# Patient Record
Sex: FEMALE | Race: WHITE | NOT HISPANIC OR LATINO | Employment: OTHER | URBAN - METROPOLITAN AREA
[De-identification: names, ages, dates, MRNs, and addresses within clinical notes are randomized per-mention and may not be internally consistent; named-entity substitution may affect disease eponyms.]

---

## 2017-05-05 DIAGNOSIS — Z95.2 S/P AVR: Primary | ICD-10-CM

## 2017-05-05 DIAGNOSIS — I10 ESSENTIAL HYPERTENSION: ICD-10-CM

## 2017-05-22 ENCOUNTER — HOSPITAL ENCOUNTER (OUTPATIENT)
Dept: CARDIOLOGY | Facility: CLINIC | Age: 82
Discharge: HOME | End: 2017-05-22
Payer: COMMERCIAL

## 2017-05-22 DIAGNOSIS — I10 ESSENTIAL HYPERTENSION: ICD-10-CM

## 2017-05-22 DIAGNOSIS — Z95.2 S/P AVR: ICD-10-CM

## 2017-05-22 LAB
ESTIMATED PA SYSTOLIC PRESSURE: 36.64
MITRAL VALVE REGURGITATION: NORMAL
RETIRED EF AND QEF - SEE NOTES: 60 (ref 55–65)
TRICUSPID VALVE REGURGITATION: NORMAL

## 2017-05-22 PROCEDURE — 93306 TTE W/DOPPLER COMPLETE: CPT | Mod: PBBFAC | Performed by: INTERNAL MEDICINE

## 2017-05-23 ENCOUNTER — OFFICE VISIT (OUTPATIENT)
Dept: CARDIOLOGY | Facility: CLINIC | Age: 82
End: 2017-05-23
Payer: COMMERCIAL

## 2017-05-23 VITALS
BODY MASS INDEX: 20.34 KG/M2 | HEIGHT: 66 IN | DIASTOLIC BLOOD PRESSURE: 63 MMHG | WEIGHT: 126.56 LBS | HEART RATE: 63 BPM | SYSTOLIC BLOOD PRESSURE: 137 MMHG

## 2017-05-23 DIAGNOSIS — I35.9 NONRHEUMATIC AORTIC VALVE DISORDER: Primary | ICD-10-CM

## 2017-05-23 DIAGNOSIS — Z87.891 FORMER CIGARETTE SMOKER: ICD-10-CM

## 2017-05-23 DIAGNOSIS — Z95.2 S/P AVR: ICD-10-CM

## 2017-05-23 DIAGNOSIS — I10 HTN (HYPERTENSION), BENIGN: ICD-10-CM

## 2017-05-23 PROCEDURE — 1159F MED LIST DOCD IN RCRD: CPT | Mod: S$GLB,,, | Performed by: INTERNAL MEDICINE

## 2017-05-23 PROCEDURE — 1160F RVW MEDS BY RX/DR IN RCRD: CPT | Mod: S$GLB,,, | Performed by: INTERNAL MEDICINE

## 2017-05-23 PROCEDURE — 3075F SYST BP GE 130 - 139MM HG: CPT | Mod: S$GLB,,, | Performed by: INTERNAL MEDICINE

## 2017-05-23 PROCEDURE — 99999 PR PBB SHADOW E&M-EST. PATIENT-LVL III: CPT | Mod: PBBFAC,,, | Performed by: INTERNAL MEDICINE

## 2017-05-23 PROCEDURE — 99215 OFFICE O/P EST HI 40 MIN: CPT | Mod: S$GLB,,, | Performed by: INTERNAL MEDICINE

## 2017-05-23 PROCEDURE — 1126F AMNT PAIN NOTED NONE PRSNT: CPT | Mod: S$GLB,,, | Performed by: INTERNAL MEDICINE

## 2017-05-23 PROCEDURE — 3078F DIAST BP <80 MM HG: CPT | Mod: S$GLB,,, | Performed by: INTERNAL MEDICINE

## 2017-05-23 PROCEDURE — 1157F ADVNC CARE PLAN IN RCRD: CPT | Mod: 8P,S$GLB,, | Performed by: INTERNAL MEDICINE

## 2017-05-23 RX ORDER — OMEPRAZOLE 20 MG/1
20 CAPSULE, DELAYED RELEASE ORAL DAILY
COMMUNITY
End: 2019-12-18

## 2017-05-23 RX ORDER — CANDESARTAN 4 MG/1
TABLET ORAL
Refills: 3 | COMMUNITY
Start: 2017-03-20 | End: 2021-07-27

## 2017-05-23 RX ORDER — ASPIRIN 81 MG/1
81 TABLET ORAL DAILY
COMMUNITY

## 2017-05-24 ENCOUNTER — TELEPHONE (OUTPATIENT)
Dept: CARDIOLOGY | Facility: CLINIC | Age: 82
End: 2017-05-24

## 2017-05-24 NOTE — TELEPHONE ENCOUNTER
----- Message from Consuelo Neely MA sent at 5/24/2017  9:02 AM CDT -----  Contact: Dr.Bower Espinal please call the  at [phone] he would like a copy of her echo. Thank you.

## 2017-05-24 NOTE — PROGRESS NOTES
"Subjective:   Patient ID:  Rita Lopez is a 83 y.o. female who presents for evaluation of Aortic Stenosis      HPI: Ms. Lopez is a very pleasant woman previously seen by Praveen Lloyd and Veronica who presents today for chronic f/u of valvular heart disease.  At the beginning of this year, she was having "spells" of sweating and possible weakness while at her home in [place], and at the request of her  (a retired cardiologist), she was evaluated and found to have severe aortic stenosis.  A mini-AVR was done by right thoracotomy using a 21mm pericardial bioprosthesis (by his recollection) and she has done well.  She recovered quickly and has remained active since.  She did not do cardiac rehab.    By verbal report from her , angiography was performed prior to the operation and no significant obstructive disease was noted.    She had an echocardiogram done yesterday.  My conclusions:      1 - Concentric remodeling.     2 - Normal left ventricular systolic function (EF 60-65%).     3 - Normal right ventricular systolic function .     4 - Indeterminate LV diastolic function.     5 - Biatrial enlargement.     6 - Mild tricuspid regurgitation.     7 - Normally functioning bioprosthesis in the aortic position.     8 - The estimated PA systolic pressure is 37 mmHg.     The mean gradient across the bioprosthesis was only 8mm Hg.    Past Medical History:   Diagnosis Date    Hypertension     Stenosis of aortic and mitral valves     aortic SHERRY 1.01    Subdural hematoma        Past Surgical History:   Procedure Laterality Date    CARDIAC CATHETERIZATION  2017    CARDIAC VALVE SURGERY  2017    aortic valve replacement     SECTION, CLASSIC      subural hematoma repair      TONSILLECTOMY         Social History   Substance Use Topics    Smoking status: Former Smoker     Quit date: 1995    Smokeless tobacco: Not on file    Alcohol use 2.4 oz/week     4 Glasses of wine per week     "  Comment: moderate       Family History   Problem Relation Age of Onset    Alzheimer's disease Father     Heart disease Father     Hypertension Father     Hypertension Sister     Heart attack Brother     Heart disease Brother        Current Outpatient Prescriptions   Medication Sig    aspirin (ECOTRIN) 81 MG EC tablet Take 81 mg by mouth once daily.    candesartan (ATACAND) 4 MG tablet TK 1 T PO BID    diphenhydrAMINE (BENADRYL) 12.5 mg chewable tablet Take 12.5 mg by mouth 4 (four) times daily as needed.      omeprazole (PRILOSEC) 20 MG capsule Take 20 mg by mouth 2 (two) times daily.     No current facility-administered medications for this visit.        Review of patient's allergies indicates:   Allergen Reactions    Iodinated contrast media - oral and iv dye     Pcn [penicillins] Rash       Review of Systems   Constitution: Negative.   HENT: Negative.    Eyes: Negative.    Cardiovascular: Negative.  Negative for chest pain, dyspnea on exertion, near-syncope, orthopnea and palpitations.   Respiratory: Negative.  Negative for cough, hemoptysis and shortness of breath.    Endocrine: Negative.    Hematologic/Lymphatic: Negative.    Skin: Negative.    Musculoskeletal: Negative.    Gastrointestinal: Negative.    Genitourinary: Negative.    Neurological: Negative.    Psychiatric/Behavioral: Negative.      Objective:   Physical Exam   Constitutional: She is oriented to person, place, and time. She appears well-developed and well-nourished.   HENT:   Head: Normocephalic and atraumatic.   Mouth/Throat: Oropharynx is clear and moist.   Eyes: Conjunctivae and EOM are normal. No scleral icterus.   Neck: Normal range of motion. Neck supple. No JVD present.   Cardiovascular: Normal rate, regular rhythm and intact distal pulses.  Exam reveals no gallop and no friction rub.    Murmur (a soft early systolic murmur is heard at the base) heard.  Pulmonary/Chest: Effort normal and breath sounds normal. She has no  wheezes. She has no rales.   Abdominal: Soft. Bowel sounds are normal. She exhibits no distension. There is no tenderness.   Musculoskeletal: Normal range of motion. She exhibits no edema.   Neurological: She is alert and oriented to person, place, and time.   Skin: Skin is warm and dry. No rash noted. No erythema.   Psychiatric: She has a normal mood and affect. Her behavior is normal. Judgment and thought content normal.   Vitals reviewed.      Lab Results   Component Value Date    WBC 7.21 01/23/2014    HGB 12.9 01/23/2014    HCT 40.2 01/23/2014    MCV 94 01/23/2014     01/23/2014         Chemistry        Component Value Date/Time     (L) 01/23/2013 1838    K 4.5 01/23/2013 1838     01/23/2013 1838    CO2 23 01/23/2013 1838    BUN 12 01/23/2013 1838    CREATININE 0.7 01/23/2013 1838     01/23/2013 1838        Component Value Date/Time    CALCIUM 8.7 01/23/2013 1838    ALKPHOS 38 (L) 01/23/2013 1838    AST 30 01/23/2013 1838    ALT 30 01/23/2013 1838    BILITOT 0.3 01/23/2013 1838            No results found for: CHOL  No results found for: HDL  No results found for: LDLCALC  No results found for: TRIG  No results found for: CHOLHDL    No results found for: TSH    No results found for: HGBA1C      Assessment:     1. Nonrheumatic aortic valve disorder    2. S/P AVR - 21mm bioprosthesis (Jan 2017)    3. HTN (hypertension), benign    4. Former cigarette smoker        Plan:     Obtain outside records    Continue current medicines.    Diet/exercise goals reinforced.    Cardiac rehabilitation is technically indicated but we had a discussion about the benefits of this and given how active she is and her good baseline exercise regimen, I think the yield would be below average.    F/U 12 months    I spent > 40 minutes face to face with the patient and her  discussing her care, providing information and counseling, and answering questions.

## 2017-05-26 ENCOUNTER — PATIENT MESSAGE (OUTPATIENT)
Dept: CARDIOLOGY | Facility: CLINIC | Age: 82
End: 2017-05-26

## 2017-05-30 ENCOUNTER — PATIENT MESSAGE (OUTPATIENT)
Dept: CARDIOLOGY | Facility: CLINIC | Age: 82
End: 2017-05-30

## 2018-06-04 DIAGNOSIS — I35.9 NONRHEUMATIC AORTIC VALVE DISORDER: Primary | ICD-10-CM

## 2018-06-04 DIAGNOSIS — Z95.2 S/P AVR: ICD-10-CM

## 2018-06-28 ENCOUNTER — HOSPITAL ENCOUNTER (EMERGENCY)
Facility: HOSPITAL | Age: 83
Discharge: HOME | End: 2018-06-29
Attending: EMERGENCY MEDICINE
Payer: COMMERCIAL

## 2018-06-28 DIAGNOSIS — M25.569 KNEE PAIN, ACUTE: ICD-10-CM

## 2018-06-28 DIAGNOSIS — S80.01XA CONTUSION OF RIGHT PATELLA, INITIAL ENCOUNTER: Primary | ICD-10-CM

## 2018-06-28 PROCEDURE — 99283 EMERGENCY DEPT VISIT LOW MDM: CPT | Mod: ,,, | Performed by: PHYSICIAN ASSISTANT

## 2018-06-28 PROCEDURE — 25000003 PHARM REV CODE 250: Performed by: PHYSICIAN ASSISTANT

## 2018-06-28 PROCEDURE — 99283 EMERGENCY DEPT VISIT LOW MDM: CPT

## 2018-06-28 RX ORDER — ACETAMINOPHEN 500 MG
1000 TABLET ORAL
Status: COMPLETED | OUTPATIENT
Start: 2018-06-28 | End: 2018-06-28

## 2018-06-28 RX ADMIN — ACETAMINOPHEN 500 MG: 500 TABLET, COATED ORAL at 10:06

## 2018-06-29 VITALS
OXYGEN SATURATION: 99 % | SYSTOLIC BLOOD PRESSURE: 173 MMHG | TEMPERATURE: 98 F | WEIGHT: 120 LBS | RESPIRATION RATE: 18 BRPM | HEART RATE: 50 BPM | HEIGHT: 66 IN | DIASTOLIC BLOOD PRESSURE: 74 MMHG | BODY MASS INDEX: 19.29 KG/M2

## 2018-06-29 NOTE — DISCHARGE INSTRUCTIONS
Follow up with orthopedics as scheduled. Rest. Apply ice to the area and elevate the leg. Take Tylenol as needed for pain.

## 2018-06-29 NOTE — ED TRIAGE NOTES
Pt reports tripping over a box at work, states she slid forward on the floor and hit her R knee, bruising noted to knee. No tenderness to touch, but tender when bending and straightening knee. States she did not hit her head or lose consciousness. No other complaints

## 2018-06-29 NOTE — ED NOTES
Patient Identifiers for Rita Lopez checked and correct  LOC: The patient is awake, alert and aware of environment with an appropriate affect, the patient is oriented x 3 and speaking appropriate.  APPEARANCE: Patient resting comfortably and in no acute distress, patient is clean and well groomed, patient's clothing is properly fastened.  SKIN: The skin is warm and dry, patient has normal skin turgor and moist mucus membranes,no rashes or lesions.Skin Intact , No Breakdown Noted  Musculoskeletal :  Normal range of motion noted. Moves all extremeties well, No swelling noted. Tenderness to R knee when bending and straightening knee. Bruising noted to R knee  RESPIRATORY: Airway is open and patent, respirations are spontaneous, patient has a normal effort and rate.  CARDIAC: Patient has a normal rate and rhythm, no periphreal edema noted, capillary refill < 3 seconds.   ABDOMEN: Soft and non tender to palpation, no distention noted.   PULSES: 2+  And symmetrical in all extremeties  NEUROLOGIC: PERRL, facial expression is symmetrical, patient moving all extremities, normal sensation in all extremities when touched with a finger.The patient is awake, alert and cooperative with a calm affect, patient is aware of environment.    Will continue to monitor

## 2018-06-29 NOTE — ED PROVIDER NOTES
Encounter Date: 2018       History     Chief Complaint   Patient presents with    Knee Pain     pt states she was walking and something was in the way; she tripped over it and fell to her right knee; pt denies hitting head; denies LOC; no bleeding noted; bruising to right knee      84-year-old female presents to the ED with chief complaint of knee pain.  Patient reports a mechanical fall a couple of hours prior to arrival.  Patient reports tripping on a box on her floor causing her to fall onto her right knee on her stone floor.  She put her hands out to catch herself.  She denies pain to the upper extremities, neck pain, head trauma, numbness or tingling.  Patient is able to stand and walk with assistance.  Her right knee pain is exacerbated with extending the leg.  Denies anticoagulation use.           Review of patient's allergies indicates:   Allergen Reactions    Iodinated contrast- oral and iv dye     Pcn [penicillins] Rash     Past Medical History:   Diagnosis Date    Hypertension     Stenosis of aortic and mitral valves     aortic SHERRY 1.01    Subdural hematoma      Past Surgical History:   Procedure Laterality Date    CARDIAC CATHETERIZATION  2017    CARDIAC VALVE SURGERY  2017    aortic valve replacement     SECTION, CLASSIC      subural hematoma repair      TONSILLECTOMY       Family History   Problem Relation Age of Onset    Alzheimer's disease Father     Heart disease Father     Hypertension Father     Hypertension Sister     Heart attack Brother     Heart disease Brother      Social History   Substance Use Topics    Smoking status: Former Smoker     Quit date: 1995    Smokeless tobacco: Not on file    Alcohol use 2.4 oz/week     4 Glasses of wine per week      Comment: moderate     Review of Systems   Constitutional: Negative for fever.   HENT: Negative for sore throat.    Respiratory: Negative for shortness of breath.    Cardiovascular: Negative for chest  pain.   Gastrointestinal: Negative for nausea.   Genitourinary: Negative for dysuria.   Musculoskeletal: Positive for arthralgias. Negative for back pain and neck pain.        R knee pain   Skin: Negative for rash.   Neurological: Negative for weakness, numbness and headaches.   Hematological: Does not bruise/bleed easily.       Physical Exam     Initial Vitals [06/28/18 2148]   BP Pulse Resp Temp SpO2   (!) 167/72 63 18 98.5 °F (36.9 °C) 97 %      MAP       --         Physical Exam    Nursing note and vitals reviewed.  Constitutional: She appears well-developed and well-nourished. She is not diaphoretic.  Non-toxic appearance. She does not appear ill. No distress.   HENT:   Head: Normocephalic and atraumatic.   Neck: Neck supple.   Cardiovascular: Normal rate and regular rhythm. Exam reveals no gallop and no friction rub.    No murmur heard.  Pulmonary/Chest: Effort normal and breath sounds normal. No accessory muscle usage. No tachypnea. No respiratory distress. She has no decreased breath sounds. She has no wheezes. She has no rhonchi. She has no rales.   Abdominal: She exhibits no distension.   Musculoskeletal: Normal range of motion.   Contusion over the right patella with tenderness and moderate swelling. Full range of motion of the knee with slight pain with extension of the lower leg.  Normal sensation to light touch.  No gross deformity.  No joint laxity.  DP pulse intact.    Neurological: She is alert.   Skin: Skin is warm and dry. No rash noted. No pallor.   Psychiatric: She has a normal mood and affect. Her behavior is normal.         ED Course   Procedures  Labs Reviewed - No data to display       Imaging Results          X-Ray Knee 3 View Right (Final result)  Result time 06/28/18 23:55:12    Final result by Oh Infante MD (06/28/18 23:55:12)                 Impression:      No acute fracture.  No acute findings.    Degenerative changes as above.  Chondrocalcinosis suggests possibility of CPPD  arthropathy.  Calcification in the suprapatellar recess suggest possibility of osteochondromatosis.      Electronically signed by: Oh Infante MD  Date:    06/28/2018  Time:    23:55             Narrative:    EXAMINATION:  XR KNEE 3 VIEW RIGHT    CLINICAL HISTORY:  Pain in unspecified knee    TECHNIQUE:  AP, lateral, and Merchant views of the right knee were performed.    COMPARISON:  None    FINDINGS:  No fracture or dislocation.  No joint effusion.  Degenerative narrowing of the patellofemoral joint space with spurring.  Mild spurring at the tibial spines.  Calcification in the suprapatellar recess.  Chondrocalcinosis in the tibial femoral joints.    Atherosclerotic vascular calcifications present.                                 Medical Decision Making:   History:   Old Medical Records: I decided to obtain old medical records.  Differential Diagnosis:   My differential diagnosis includes but is not limited to:  Contusion, sprain, fracture, dislocation, ligamentous injury   Clinical Tests:   Radiological Study: Ordered and Reviewed       APC / Resident Notes:   85 yo F presents for evaluation of R knee pain after a mechanical fall today.  She is neurovascularly intact.  There is a patellar contusion with small effusion.    X-ray reveals no acute fracture or other acute acute process.  I will discharge patient her with instructions for RICE therapy and Tylenol as needed for pain. Patient reports that she has an appointment with her orthopedist tomorrow. I have reviewed the patient's records and discussed this case with my supervising physician.                   Clinical Impression:   The primary encounter diagnosis was Contusion of right patella, initial encounter. A diagnosis of Knee pain, acute was also pertinent to this visit.      Disposition:   Disposition: Discharged  Condition: Stable                        Lissette Moreno PA-C  06/29/18 0017

## 2018-07-24 ENCOUNTER — HOSPITAL ENCOUNTER (OUTPATIENT)
Dept: CARDIOLOGY | Facility: CLINIC | Age: 83
Discharge: HOME | End: 2018-07-24
Attending: INTERNAL MEDICINE
Payer: COMMERCIAL

## 2018-07-24 DIAGNOSIS — Z95.2 S/P AVR: ICD-10-CM

## 2018-07-24 DIAGNOSIS — I35.9 NONRHEUMATIC AORTIC VALVE DISORDER: ICD-10-CM

## 2018-07-24 PROCEDURE — 93306 TTE W/DOPPLER COMPLETE: CPT | Mod: S$GLB,,, | Performed by: INTERNAL MEDICINE

## 2018-07-26 LAB
DIASTOLIC DYSFUNCTION: NO
ESTIMATED PA SYSTOLIC PRESSURE: 34.14
MITRAL VALVE REGURGITATION: NORMAL
RETIRED EF AND QEF - SEE NOTES: 60 (ref 55–65)
TRICUSPID VALVE REGURGITATION: NORMAL

## 2018-07-27 ENCOUNTER — OFFICE VISIT (OUTPATIENT)
Dept: CARDIOLOGY | Facility: CLINIC | Age: 83
End: 2018-07-27
Payer: COMMERCIAL

## 2018-07-27 VITALS
HEIGHT: 66 IN | WEIGHT: 127 LBS | BODY MASS INDEX: 20.41 KG/M2 | SYSTOLIC BLOOD PRESSURE: 124 MMHG | DIASTOLIC BLOOD PRESSURE: 67 MMHG | HEART RATE: 65 BPM

## 2018-07-27 DIAGNOSIS — I10 HTN (HYPERTENSION), BENIGN: ICD-10-CM

## 2018-07-27 DIAGNOSIS — I35.9 NONRHEUMATIC AORTIC VALVE DISORDER: Primary | ICD-10-CM

## 2018-07-27 DIAGNOSIS — E78.5 DYSLIPIDEMIA: ICD-10-CM

## 2018-07-27 DIAGNOSIS — Z87.891 FORMER CIGARETTE SMOKER: ICD-10-CM

## 2018-07-27 DIAGNOSIS — Z95.2 S/P AVR: ICD-10-CM

## 2018-07-27 PROCEDURE — 99999 PR PBB SHADOW E&M-EST. PATIENT-LVL III: CPT | Mod: PBBFAC,,, | Performed by: INTERNAL MEDICINE

## 2018-07-27 PROCEDURE — 3078F DIAST BP <80 MM HG: CPT | Mod: CPTII,S$GLB,, | Performed by: INTERNAL MEDICINE

## 2018-07-27 PROCEDURE — 3074F SYST BP LT 130 MM HG: CPT | Mod: CPTII,S$GLB,, | Performed by: INTERNAL MEDICINE

## 2018-07-27 PROCEDURE — 99214 OFFICE O/P EST MOD 30 MIN: CPT | Mod: S$GLB,,, | Performed by: INTERNAL MEDICINE

## 2018-07-27 RX ORDER — PNV NO.95/FERROUS FUM/FOLIC AC 28MG-0.8MG
100 TABLET ORAL
COMMUNITY
End: 2019-12-18

## 2018-07-27 RX ORDER — VIT C/E/ZN/COPPR/LUTEIN/ZEAXAN 250MG-90MG
1000 CAPSULE ORAL DAILY
COMMUNITY
Start: 2017-07-03 | End: 2021-07-27

## 2018-07-27 NOTE — PROGRESS NOTES
"Subjective:   Patient ID:  Rita Lopez is a 84 y.o. female who presents for follow up of No chief complaint on file.      HPI: Yearly f/u of aortic valve disease s/p 21mm bioprosthesis AVR plus HTN and dyslipidemia.      She denies chest discomfort, GALVAN, palpitations, PND/orthopnea, lightheadedness and syncope.    Recent echocardiogram looked good with no important new findings.    5/23/17 HPI: Ms. Lopez is a very pleasant woman previously seen by Praveen Lloyd and Veronica who presents today for chronic f/u of valvular heart disease.  At the beginning of this year, she was having "spells" of sweating and possible weakness while at her home in [place], and at the request of her  (a retired cardiologist), she was evaluated and found to have severe aortic stenosis.  A mini-AVR was done by right thoracotomy using a 21mm pericardial bioprosthesis (by his recollection) and she has done well.  She recovered quickly and has remained active since.  She did not do cardiac rehab.     By verbal report from her , angiography was performed prior to the operation and no significant obstructive disease was noted.     She had an echocardiogram done yesterday.  My conclusions:      1 - Concentric remodeling.     2 - Normal left ventricular systolic function (EF 60-65%).     3 - Normal right ventricular systolic function .     4 - Indeterminate LV diastolic function.     5 - Biatrial enlargement.     6 - Mild tricuspid regurgitation.     7 - Normally functioning bioprosthesis in the aortic position.     8 - The estimated PA systolic pressure is 37 mmHg.      The mean gradient across the bioprosthesis was only 8mm Hg.    Patient Active Problem List   Diagnosis    Chest pain    Nonrheumatic aortic valve disorder    HTN (hypertension), benign    Sleep disorder    Dementia    Dyslipidemia    Former cigarette smoker    S/P AVR - 21mm bioprosthesis (Jan 2017)       Current Outpatient Prescriptions   Medication " Sig    aspirin (ECOTRIN) 81 MG EC tablet Take 81 mg by mouth once daily.    candesartan (ATACAND) 4 MG tablet TK 1 T PO BID    diphenhydrAMINE (BENADRYL) 12.5 mg chewable tablet Take 12.5 mg by mouth 4 (four) times daily as needed.      omeprazole (PRILOSEC) 20 MG capsule Take 20 mg by mouth 2 (two) times daily.     No current facility-administered medications for this visit.        Review of Systems   Constitution: Negative.   HENT: Negative.    Eyes: Negative.    Cardiovascular: Negative.  Negative for chest pain, dyspnea on exertion, near-syncope, orthopnea and palpitations.   Respiratory: Negative.  Negative for cough, hemoptysis and shortness of breath.    Endocrine: Negative.    Hematologic/Lymphatic: Negative.    Skin: Negative.    Musculoskeletal: Negative.    Gastrointestinal: Negative.    Genitourinary: Negative.    Neurological: Negative.    Psychiatric/Behavioral: Negative.      Objective:   Physical Exam   Constitutional: She is oriented to person, place, and time. She appears well-developed and well-nourished.   HENT:   Head: Normocephalic and atraumatic.   Mouth/Throat: Oropharynx is clear and moist.   Eyes: Conjunctivae and EOM are normal. No scleral icterus.   Neck: Normal range of motion. Neck supple. No JVD present.   Cardiovascular: Normal rate, regular rhythm, normal heart sounds and intact distal pulses.  Exam reveals no gallop and no friction rub.    No murmur heard.  Pulmonary/Chest: Effort normal and breath sounds normal. She has no wheezes. She has no rales.   Abdominal: Soft. Bowel sounds are normal. She exhibits no distension. There is no tenderness.   Musculoskeletal: Normal range of motion. She exhibits no edema.   Neurological: She is alert and oriented to person, place, and time.   Skin: Skin is warm and dry. No rash noted. No erythema.   Psychiatric: She has a normal mood and affect. Her behavior is normal. Judgment and thought content normal.   Vitals reviewed.      Lab Results    Component Value Date    WBC 7.21 01/23/2014    HGB 12.9 01/23/2014    HCT 40.2 01/23/2014    MCV 94 01/23/2014     01/23/2014         Chemistry        Component Value Date/Time     (L) 01/23/2013 1838    K 4.5 01/23/2013 1838     01/23/2013 1838    CO2 23 01/23/2013 1838    BUN 12 01/23/2013 1838    CREATININE 0.7 01/23/2013 1838     01/23/2013 1838        Component Value Date/Time    CALCIUM 8.7 01/23/2013 1838    ALKPHOS 38 (L) 01/23/2013 1838    AST 30 01/23/2013 1838    ALT 30 01/23/2013 1838    BILITOT 0.3 01/23/2013 1838    ESTGFRAFRICA >60 01/23/2013 1838    EGFRNONAA >60 01/23/2013 1838            No results found for: CHOL  No results found for: HDL  No results found for: LDLCALC  No results found for: TRIG  No results found for: CHOLHDL    No results found for: TSH    No results found for: HGBA1C    Assessment:     1. Nonrheumatic aortic valve disorder    2. S/P AVR - 21mm bioprosthesis (Jan 2017)    3. HTN (hypertension), benign    4. Dyslipidemia    5. Former cigarette smoker        Plan:     Continue current medicines.    Diet/exercise goals reinforced.    F/U 12 months

## 2019-07-23 DIAGNOSIS — Z95.2 S/P AVR: ICD-10-CM

## 2019-07-23 DIAGNOSIS — I35.9 NONRHEUMATIC AORTIC VALVE DISORDER: Primary | ICD-10-CM

## 2019-11-11 DIAGNOSIS — R00.1 BRADYCARDIA: Primary | ICD-10-CM

## 2019-12-10 ENCOUNTER — CLINICAL SUPPORT (OUTPATIENT)
Dept: CARDIOLOGY | Facility: HOSPITAL | Age: 84
End: 2019-12-10
Attending: INTERNAL MEDICINE
Payer: COMMERCIAL

## 2019-12-10 ENCOUNTER — HOSPITAL ENCOUNTER (OUTPATIENT)
Dept: CARDIOLOGY | Facility: CLINIC | Age: 84
Discharge: HOME | End: 2019-12-10
Attending: INTERNAL MEDICINE
Payer: COMMERCIAL

## 2019-12-10 VITALS
SYSTOLIC BLOOD PRESSURE: 124 MMHG | BODY MASS INDEX: 19.29 KG/M2 | DIASTOLIC BLOOD PRESSURE: 78 MMHG | HEART RATE: 56 BPM | WEIGHT: 120 LBS | HEIGHT: 66 IN

## 2019-12-10 DIAGNOSIS — R00.1 BRADYCARDIA: ICD-10-CM

## 2019-12-10 DIAGNOSIS — Z95.2 S/P AVR: ICD-10-CM

## 2019-12-10 DIAGNOSIS — I35.9 NONRHEUMATIC AORTIC VALVE DISORDER: ICD-10-CM

## 2019-12-10 LAB
ASCENDING AORTA: 3.84 CM
AV INDEX (PROSTH): 0.53
AV MEAN GRADIENT: 13 MMHG
AV PEAK GRADIENT: 25 MMHG
AV VALVE AREA: 1.59 CM2
AV VELOCITY RATIO: 0.49
BSA FOR ECHO PROCEDURE: 1.59 M2
CV ECHO LV RWT: 0.44 CM
DOP CALC AO PEAK VEL: 2.51 M/S
DOP CALC AO VTI: 52.51 CM
DOP CALC LVOT AREA: 3 CM2
DOP CALC LVOT DIAMETER: 1.96 CM
DOP CALC LVOT PEAK VEL: 1.22 M/S
DOP CALC LVOT STROKE VOLUME: 83.23 CM3
DOP CALCLVOT PEAK VEL VTI: 27.6 CM
E WAVE DECELERATION TIME: 208.85 MSEC
E/A RATIO: 0.91
E/E' RATIO: 9.29 M/S
ECHO LV POSTERIOR WALL: 0.9 CM (ref 0.6–1.1)
FRACTIONAL SHORTENING: 41 % (ref 28–44)
INTERVENTRICULAR SEPTUM: 1.02 CM (ref 0.6–1.1)
LA MAJOR: 4.9 CM
LA MINOR: 4.97 CM
LA WIDTH: 3.71 CM
LEFT ATRIUM SIZE: 3.64 CM
LEFT ATRIUM VOLUME INDEX: 35.2 ML/M2
LEFT ATRIUM VOLUME: 56.64 CM3
LEFT INTERNAL DIMENSION IN SYSTOLE: 2.41 CM (ref 2.1–4)
LEFT VENTRICLE DIASTOLIC VOLUME INDEX: 36.16 ML/M2
LEFT VENTRICLE DIASTOLIC VOLUME: 58.21 ML
LEFT VENTRICLE MASS INDEX: 78 G/M2
LEFT VENTRICLE SYSTOLIC VOLUME INDEX: 12.7 ML/M2
LEFT VENTRICLE SYSTOLIC VOLUME: 20.46 ML
LEFT VENTRICULAR INTERNAL DIMENSION IN DIASTOLE: 4.1 CM (ref 3.5–6)
LEFT VENTRICULAR MASS: 124.77 G
LV LATERAL E/E' RATIO: 9.88 M/S
LV SEPTAL E/E' RATIO: 8.78 M/S
MV PEAK A VEL: 0.87 M/S
MV PEAK E VEL: 0.79 M/S
PISA TR MAX VEL: 2.63 M/S
PULM VEIN S/D RATIO: 1.15
PV PEAK D VEL: 0.4 M/S
PV PEAK S VEL: 0.46 M/S
RA MAJOR: 5.5 CM
RA PRESSURE: 3 MMHG
RA WIDTH: 3.9 CM
RIGHT VENTRICULAR END-DIASTOLIC DIMENSION: 3.43 CM
RV TISSUE DOPPLER FREE WALL SYSTOLIC VELOCITY 1 (APICAL 4 CHAMBER VIEW): 12 CM/S
SINUS: 3.1 CM
STJ: 3.01 CM
TDI LATERAL: 0.08 M/S
TDI SEPTAL: 0.09 M/S
TDI: 0.09 M/S
TR MAX PG: 28 MMHG
TRICUSPID ANNULAR PLANE SYSTOLIC EXCURSION: 1.94 CM
TV REST PULMONARY ARTERY PRESSURE: 31 MMHG

## 2019-12-10 PROCEDURE — 93306 TRANSTHORACIC ECHO (TTE) COMPLETE (CUPID ONLY): ICD-10-PCS | Mod: S$GLB,,, | Performed by: INTERNAL MEDICINE

## 2019-12-10 PROCEDURE — 93227 XTRNL ECG REC<48 HR R&I: CPT | Mod: ,,, | Performed by: INTERNAL MEDICINE

## 2019-12-10 PROCEDURE — 93225 XTRNL ECG REC<48 HRS REC: CPT

## 2019-12-10 PROCEDURE — 93227 HOLTER MONITOR - 48 HOUR (CUPID ONLY): ICD-10-PCS | Mod: ,,, | Performed by: INTERNAL MEDICINE

## 2019-12-10 PROCEDURE — 93306 TTE W/DOPPLER COMPLETE: CPT | Mod: PBBFAC | Performed by: INTERNAL MEDICINE

## 2019-12-16 LAB
OHS CV EVENT MONITOR DAY: 0
OHS CV HOLTER LENGTH DECIMAL HOURS: 48
OHS CV HOLTER LENGTH HOURS: 48
OHS CV HOLTER LENGTH MINUTES: 0

## 2019-12-18 ENCOUNTER — OFFICE VISIT (OUTPATIENT)
Dept: CARDIOLOGY | Facility: CLINIC | Age: 84
End: 2019-12-18
Payer: COMMERCIAL

## 2019-12-18 VITALS
HEIGHT: 64 IN | WEIGHT: 140.88 LBS | SYSTOLIC BLOOD PRESSURE: 145 MMHG | DIASTOLIC BLOOD PRESSURE: 70 MMHG | HEART RATE: 52 BPM | BODY MASS INDEX: 24.05 KG/M2

## 2019-12-18 DIAGNOSIS — Z87.891 FORMER CIGARETTE SMOKER: ICD-10-CM

## 2019-12-18 DIAGNOSIS — Z95.2 S/P AVR: ICD-10-CM

## 2019-12-18 DIAGNOSIS — I35.9 NONRHEUMATIC AORTIC VALVE DISORDER: Primary | ICD-10-CM

## 2019-12-18 DIAGNOSIS — E78.5 DYSLIPIDEMIA: ICD-10-CM

## 2019-12-18 DIAGNOSIS — I10 HTN (HYPERTENSION), BENIGN: ICD-10-CM

## 2019-12-18 PROCEDURE — 99213 OFFICE O/P EST LOW 20 MIN: CPT | Mod: PBBFAC | Performed by: INTERNAL MEDICINE

## 2019-12-18 PROCEDURE — 99999 PR PBB SHADOW E&M-EST. PATIENT-LVL III: CPT | Mod: PBBFAC,,, | Performed by: INTERNAL MEDICINE

## 2019-12-18 PROCEDURE — 99214 PR OFFICE/OUTPT VISIT, EST, LEVL IV, 30-39 MIN: ICD-10-PCS | Mod: S$GLB,,, | Performed by: INTERNAL MEDICINE

## 2019-12-18 PROCEDURE — 99999 PR PBB SHADOW E&M-EST. PATIENT-LVL III: ICD-10-PCS | Mod: PBBFAC,,, | Performed by: INTERNAL MEDICINE

## 2019-12-18 PROCEDURE — 99214 OFFICE O/P EST MOD 30 MIN: CPT | Mod: S$GLB,,, | Performed by: INTERNAL MEDICINE

## 2019-12-18 PROCEDURE — 1159F MED LIST DOCD IN RCRD: CPT | Mod: S$GLB,,, | Performed by: INTERNAL MEDICINE

## 2019-12-18 PROCEDURE — 1159F PR MEDICATION LIST DOCUMENTED IN MEDICAL RECORD: ICD-10-PCS | Mod: S$GLB,,, | Performed by: INTERNAL MEDICINE

## 2019-12-18 PROCEDURE — 1126F PR PAIN SEVERITY QUANTIFIED, NO PAIN PRESENT: ICD-10-PCS | Mod: S$GLB,,, | Performed by: INTERNAL MEDICINE

## 2019-12-18 PROCEDURE — 1126F AMNT PAIN NOTED NONE PRSNT: CPT | Mod: S$GLB,,, | Performed by: INTERNAL MEDICINE

## 2019-12-18 RX ORDER — ESCITALOPRAM OXALATE 10 MG/1
10 TABLET ORAL DAILY
Refills: 5 | COMMUNITY
Start: 2019-12-03

## 2019-12-18 RX ORDER — ROSUVASTATIN CALCIUM 10 MG/1
TABLET, COATED ORAL
Refills: 3 | COMMUNITY
Start: 2019-12-04 | End: 2021-07-27

## 2019-12-18 RX ORDER — FAMOTIDINE 40 MG/1
TABLET, FILM COATED ORAL
Refills: 3 | COMMUNITY
Start: 2019-11-22 | End: 2021-07-27

## 2019-12-18 NOTE — PROGRESS NOTES
"Subjective:   Patient ID:  Rita Lopez is a 86 y.o. female who presents for follow up of Nonrheumatic aortic valve disorder (1 yr fu)      HPI: Yearly f/u as below.  She is doing well with no new symptoms or cardiovascular complaints and no change in exercise capacity.  She denies chest discomfort, GALVAN, palpitations, PND/orthopnea, lightheadedness and syncope.      2018 HPI: Yearly f/u of aortic valve disease s/p 21mm bioprosthesis AVR plus HTN and dyslipidemia.       She denies chest discomfort, GALVAN, palpitations, PND/orthopnea, lightheadedness and syncope.     Recent echocardiogram looked good with no important new findings.     5/23/17 HPI: Ms. Lopez is a very pleasant woman previously seen by Praveen Lloyd and Veronica who presents today for chronic f/u of valvular heart disease.  At the beginning of this year, she was having "spells" of sweating and possible weakness while at her home in [place], and at the request of her  (a retired cardiologist), she was evaluated and found to have severe aortic stenosis.  A mini-AVR was done by right thoracotomy using a 21mm pericardial bioprosthesis (by his recollection) and she has done well.  She recovered quickly and has remained active since.  She did not do cardiac rehab.     By verbal report from her , angiography was performed prior to the operation and no significant obstructive disease was noted.    Patient Active Problem List   Diagnosis    Chest pain    Nonrheumatic aortic valve disorder    HTN (hypertension), benign    Sleep disorder    Dementia    Dyslipidemia    Former cigarette smoker    S/P AVR - 21mm bioprosthesis (Jan 2017)       Current Outpatient Medications   Medication Sig    aspirin (ECOTRIN) 81 MG EC tablet Take 81 mg by mouth once daily.    candesartan (ATACAND) 4 MG tablet TK 1 T PO DAILY    cholecalciferol, vitamin D3, 1,000 unit capsule Take 1,000 Units by mouth once daily.     escitalopram oxalate (LEXAPRO) 10 MG " tablet Take 10 mg by mouth once daily.     famotidine (PEPCID) 40 MG tablet TK 1 T PO QHS    rosuvastatin (CRESTOR) 10 MG tablet TK 1 T PO QD     No current facility-administered medications for this visit.        Review of Systems   Constitution: Negative.   HENT: Negative.    Eyes: Negative.    Cardiovascular: Negative.  Negative for chest pain, dyspnea on exertion, near-syncope, orthopnea and palpitations.   Respiratory: Negative.  Negative for cough and shortness of breath.    Endocrine: Negative.    Hematologic/Lymphatic: Negative.    Skin: Negative.    Musculoskeletal: Negative.    Gastrointestinal: Negative.    Genitourinary: Negative.    Neurological: Negative.    Psychiatric/Behavioral: Negative.      Objective:   Physical Exam   Constitutional: She is oriented to person, place, and time. She appears well-developed and well-nourished.   HENT:   Head: Normocephalic and atraumatic.   Mouth/Throat: Oropharynx is clear and moist.   Eyes: Conjunctivae and EOM are normal. No scleral icterus.   Neck: Normal range of motion. Neck supple. No JVD present.   Cardiovascular: Normal rate, regular rhythm and intact distal pulses. Exam reveals no gallop and no friction rub.   Murmur (benign II/VI systolic murmur at the base) heard.  Pulmonary/Chest: Effort normal and breath sounds normal. She has no wheezes. She has no rales.   Abdominal: Soft. Bowel sounds are normal. She exhibits no distension. There is no tenderness.   Musculoskeletal: Normal range of motion. She exhibits no edema.   Neurological: She is alert and oriented to person, place, and time.   Skin: Skin is warm and dry. No rash noted. No erythema.   Psychiatric: She has a normal mood and affect. Her behavior is normal. Judgment and thought content normal.   Vitals reviewed.      Lab Results   Component Value Date    WBC 7.21 01/23/2014    HGB 12.9 01/23/2014    HCT 40.2 01/23/2014    MCV 94 01/23/2014     01/23/2014         Chemistry        Component  Value Date/Time     (L) 01/23/2013 1838    K 4.5 01/23/2013 1838     01/23/2013 1838    CO2 23 01/23/2013 1838    BUN 12 01/23/2013 1838    CREATININE 0.7 01/23/2013 1838     01/23/2013 1838        Component Value Date/Time    CALCIUM 8.7 01/23/2013 1838    ALKPHOS 38 (L) 01/23/2013 1838    AST 30 01/23/2013 1838    ALT 30 01/23/2013 1838    BILITOT 0.3 01/23/2013 1838    ESTGFRAFRICA >60 01/23/2013 1838    EGFRNONAA >60 01/23/2013 1838            No results found for: CHOL  No results found for: HDL  No results found for: LDLCALC  No results found for: TRIG  No results found for: CHOLHDL    No results found for: TSH    No results found for: HGBA1C    Assessment:     1. Nonrheumatic aortic valve disorder    2. S/P AVR - 21mm bioprosthesis (Jan 2017)    3. Former cigarette smoker    4. Dyslipidemia    5. HTN (hypertension), benign        Plan:

## 2020-08-14 ENCOUNTER — TELEPHONE (OUTPATIENT)
Dept: OPHTHALMOLOGY | Facility: CLINIC | Age: 85
End: 2020-08-14

## 2020-08-14 NOTE — TELEPHONE ENCOUNTER
Spoke with Dr. Gilliam and advised him that Dr. Chen sees pediatric patients, but offered to schedule patient with someone else in ophthalmology, but Dr. Gilliam declined.     -TD         ----- Message from Nely Moreland sent at 8/14/2020  9:12 AM CDT -----  Contact: Dr. Gilliam @ [phone]  Dr. Gilliam is calling on behalf of pt. He says he was called yesterday, but missed the call. It's about scheduling pt w/

## 2021-06-17 DIAGNOSIS — I10 HTN (HYPERTENSION), BENIGN: ICD-10-CM

## 2021-06-17 DIAGNOSIS — Z95.2 S/P AVR: ICD-10-CM

## 2021-06-17 DIAGNOSIS — I35.9 NONRHEUMATIC AORTIC VALVE DISORDER: Primary | ICD-10-CM

## 2021-07-26 ENCOUNTER — HOSPITAL ENCOUNTER (OUTPATIENT)
Dept: CARDIOLOGY | Facility: HOSPITAL | Age: 86
Discharge: HOME | End: 2021-07-26
Attending: INTERNAL MEDICINE
Payer: COMMERCIAL

## 2021-07-26 VITALS
HEIGHT: 64 IN | HEART RATE: 63 BPM | WEIGHT: 140 LBS | SYSTOLIC BLOOD PRESSURE: 138 MMHG | BODY MASS INDEX: 23.9 KG/M2 | DIASTOLIC BLOOD PRESSURE: 82 MMHG

## 2021-07-26 DIAGNOSIS — I35.9 NONRHEUMATIC AORTIC VALVE DISORDER: ICD-10-CM

## 2021-07-26 DIAGNOSIS — Z95.2 S/P AVR: ICD-10-CM

## 2021-07-26 DIAGNOSIS — I10 HTN (HYPERTENSION), BENIGN: ICD-10-CM

## 2021-07-26 LAB
ASCENDING AORTA: 3.76 CM
AV INDEX (PROSTH): 0.49
AV MEAN GRADIENT: 11 MMHG
AV PEAK GRADIENT: 20 MMHG
AV VALVE AREA: 1.54 CM2
AV VELOCITY RATIO: 0.44
BSA FOR ECHO PROCEDURE: 1.69 M2
CV ECHO LV RWT: 0.29 CM
DOP CALC AO PEAK VEL: 2.25 M/S
DOP CALC AO VTI: 52.12 CM
DOP CALC LVOT AREA: 3.1 CM2
DOP CALC LVOT DIAMETER: 2 CM
DOP CALC LVOT PEAK VEL: 0.99 M/S
DOP CALC LVOT STROKE VOLUME: 80.29 CM3
DOP CALCLVOT PEAK VEL VTI: 25.57 CM
E WAVE DECELERATION TIME: 320.27 MSEC
E/A RATIO: 0.95
E/E' RATIO: 14.2 M/S
ECHO LV POSTERIOR WALL: 0.62 CM (ref 0.6–1.1)
EJECTION FRACTION: 65 %
FRACTIONAL SHORTENING: 28 % (ref 28–44)
INTERVENTRICULAR SEPTUM: 0.91 CM (ref 0.6–1.1)
LA MAJOR: 4.67 CM
LA MINOR: 5.05 CM
LA WIDTH: 4.03 CM
LEFT ATRIUM SIZE: 4.18 CM
LEFT ATRIUM VOLUME INDEX MOD: 23 ML/M2
LEFT ATRIUM VOLUME INDEX: 41.4 ML/M2
LEFT ATRIUM VOLUME MOD: 38.57 CM3
LEFT ATRIUM VOLUME: 69.48 CM3
LEFT INTERNAL DIMENSION IN SYSTOLE: 3.05 CM (ref 2.1–4)
LEFT VENTRICLE DIASTOLIC VOLUME INDEX: 46.93 ML/M2
LEFT VENTRICLE DIASTOLIC VOLUME: 78.85 ML
LEFT VENTRICLE MASS INDEX: 57 G/M2
LEFT VENTRICLE SYSTOLIC VOLUME INDEX: 21.6 ML/M2
LEFT VENTRICLE SYSTOLIC VOLUME: 36.3 ML
LEFT VENTRICULAR INTERNAL DIMENSION IN DIASTOLE: 4.21 CM (ref 3.5–6)
LEFT VENTRICULAR MASS: 95.86 G
LV LATERAL E/E' RATIO: 10.14 M/S
LV SEPTAL E/E' RATIO: 23.67 M/S
MV A" WAVE DURATION": 14.08 MSEC
MV PEAK A VEL: 0.75 M/S
MV PEAK E VEL: 0.71 M/S
MV STENOSIS PRESSURE HALF TIME: 92.88 MS
MV VALVE AREA P 1/2 METHOD: 2.37 CM2
PISA TR MAX VEL: 2.56 M/S
PULM VEIN S/D RATIO: 1.45
PV PEAK D VEL: 0.33 M/S
PV PEAK S VEL: 0.48 M/S
RA PRESSURE: 3 MMHG
RIGHT VENTRICULAR END-DIASTOLIC DIMENSION: 3.9 CM
RV TISSUE DOPPLER FREE WALL SYSTOLIC VELOCITY 1 (APICAL 4 CHAMBER VIEW): 11.44 CM/S
SINUS: 3.4 CM
STJ: 3.09 CM
TDI LATERAL: 0.07 M/S
TDI SEPTAL: 0.03 M/S
TDI: 0.05 M/S
TR MAX PG: 26 MMHG
TRICUSPID ANNULAR PLANE SYSTOLIC EXCURSION: 1.72 CM
TV REST PULMONARY ARTERY PRESSURE: 29 MMHG

## 2021-07-26 PROCEDURE — 93306 TTE W/DOPPLER COMPLETE: CPT | Mod: 26,,, | Performed by: INTERNAL MEDICINE

## 2021-07-26 PROCEDURE — 93306 TTE W/DOPPLER COMPLETE: CPT

## 2021-07-26 PROCEDURE — 93306 ECHO (CUPID ONLY): ICD-10-PCS | Mod: 26,,, | Performed by: INTERNAL MEDICINE

## 2021-07-27 ENCOUNTER — OFFICE VISIT (OUTPATIENT)
Dept: CARDIOLOGY | Facility: CLINIC | Age: 86
End: 2021-07-27
Payer: COMMERCIAL

## 2021-07-27 VITALS
HEIGHT: 64 IN | HEART RATE: 57 BPM | WEIGHT: 130.94 LBS | BODY MASS INDEX: 22.35 KG/M2 | SYSTOLIC BLOOD PRESSURE: 151 MMHG | DIASTOLIC BLOOD PRESSURE: 72 MMHG

## 2021-07-27 DIAGNOSIS — Z87.891 FORMER CIGARETTE SMOKER: ICD-10-CM

## 2021-07-27 DIAGNOSIS — I34.0 MITRAL VALVE INSUFFICIENCY, UNSPECIFIED ETIOLOGY: ICD-10-CM

## 2021-07-27 DIAGNOSIS — I35.9 NONRHEUMATIC AORTIC VALVE DISORDER: Primary | ICD-10-CM

## 2021-07-27 DIAGNOSIS — Z95.2 S/P AVR: ICD-10-CM

## 2021-07-27 DIAGNOSIS — I10 HTN (HYPERTENSION), BENIGN: ICD-10-CM

## 2021-07-27 DIAGNOSIS — R00.1 BRADYCARDIA: ICD-10-CM

## 2021-07-27 DIAGNOSIS — I51.89 DIASTOLIC DYSFUNCTION: ICD-10-CM

## 2021-07-27 DIAGNOSIS — E78.5 DYSLIPIDEMIA: ICD-10-CM

## 2021-07-27 PROCEDURE — 1101F PT FALLS ASSESS-DOCD LE1/YR: CPT | Mod: CPTII,S$GLB,, | Performed by: NURSE PRACTITIONER

## 2021-07-27 PROCEDURE — 1126F PR PAIN SEVERITY QUANTIFIED, NO PAIN PRESENT: ICD-10-PCS | Mod: CPTII,S$GLB,, | Performed by: NURSE PRACTITIONER

## 2021-07-27 PROCEDURE — 3288F FALL RISK ASSESSMENT DOCD: CPT | Mod: CPTII,S$GLB,, | Performed by: NURSE PRACTITIONER

## 2021-07-27 PROCEDURE — 99999 PR PBB SHADOW E&M-EST. PATIENT-LVL III: CPT | Mod: PBBFAC,,, | Performed by: NURSE PRACTITIONER

## 2021-07-27 PROCEDURE — 99214 PR OFFICE/OUTPT VISIT, EST, LEVL IV, 30-39 MIN: ICD-10-PCS | Mod: S$GLB,,, | Performed by: NURSE PRACTITIONER

## 2021-07-27 PROCEDURE — 3288F PR FALLS RISK ASSESSMENT DOCUMENTED: ICD-10-PCS | Mod: CPTII,S$GLB,, | Performed by: NURSE PRACTITIONER

## 2021-07-27 PROCEDURE — 99999 PR PBB SHADOW E&M-EST. PATIENT-LVL III: ICD-10-PCS | Mod: PBBFAC,,, | Performed by: NURSE PRACTITIONER

## 2021-07-27 PROCEDURE — 1101F PR PT FALLS ASSESS DOC 0-1 FALLS W/OUT INJ PAST YR: ICD-10-PCS | Mod: CPTII,S$GLB,, | Performed by: NURSE PRACTITIONER

## 2021-07-27 PROCEDURE — 1126F AMNT PAIN NOTED NONE PRSNT: CPT | Mod: CPTII,S$GLB,, | Performed by: NURSE PRACTITIONER

## 2021-07-27 PROCEDURE — 99214 OFFICE O/P EST MOD 30 MIN: CPT | Mod: S$GLB,,, | Performed by: NURSE PRACTITIONER

## 2021-07-27 RX ORDER — MEMANTINE HYDROCHLORIDE 5 MG/1
TABLET ORAL
COMMUNITY
Start: 2021-06-30

## 2021-07-27 RX ORDER — MEMANTINE HYDROCHLORIDE 10 MG/1
10 TABLET ORAL 2 TIMES DAILY
COMMUNITY
Start: 2021-06-29

## 2021-07-27 RX ORDER — ESOMEPRAZOLE MAGNESIUM 40 MG/1
40 CAPSULE, DELAYED RELEASE ORAL DAILY
COMMUNITY
Start: 2021-07-19

## 2021-08-02 PROBLEM — I51.89 DIASTOLIC DYSFUNCTION: Status: ACTIVE | Noted: 2021-08-02

## 2021-08-02 PROBLEM — I34.0 MITRAL VALVE INSUFFICIENCY: Status: ACTIVE | Noted: 2021-08-02
